# Patient Record
(demographics unavailable — no encounter records)

---

## 2025-02-18 NOTE — IMAGING
[Disc space narrowing] : Disc space narrowing [AP] : anteroposterior [There are no fractures, subluxations or dislocations. No significant abnormalities are seen] : There are no fractures, subluxations or dislocations. No significant abnormalities are seen [FreeTextEntry1] : L4/5 ddd

## 2025-02-18 NOTE — ASSESSMENT
[FreeTextEntry1] : The NSAID and muscle relaxer prescribed from ER are appopriate Referred to PT If no improvement, may need updated MRI
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
## 2025-06-02 NOTE — ASSESSMENT
[FreeTextEntry1] : Ibuprofen and Tizanidine prescribed- medication precautions discussed Needs MRI lumbar to r/o HNP